# Patient Record
Sex: FEMALE | Race: AMERICAN INDIAN OR ALASKA NATIVE
[De-identification: names, ages, dates, MRNs, and addresses within clinical notes are randomized per-mention and may not be internally consistent; named-entity substitution may affect disease eponyms.]

---

## 2017-08-04 NOTE — EMERGENCY DEPARTMENT REPORT
Entered by ANGELINA SHEFFIELD, acting as scribe for ANTONIA CARVALHO PA.





ED N/V/D HPI





- General


Chief complaint: Nausea/Vomiting/Diarrhea


Stated complaint: SICK


Time Seen by Provider: 08/04/17 16:05


Source: patient


Mode of arrival: Ambulatory


Limitations: No Limitations





- History of Present Illness


Initial comments: 





23 y/o female with no significant PMHx presents to the ED c/o nausea and 

diarrhea that began this morning. Patient states she hasn't "been feeling right

" for 2 days. Reports associated generalized body aches and urinary frequency, 

but she denies headache, dizziness, stiff neck, fever, chills, abdominal pain, 

vomiting, dysuria, urinary urgency, vaginal discharge, back pain, cough, chest 

pain, and SOB. Rates body aches a 7/10 in severity, which she describes as dull 

and aching in quality.  Noted after eating breakfast this morning.Aggravated 

with movement and alleviated with immobilization. Denies any PMHx and PSHx. 

Denies taking any new or recent medication at home. Denies taking any 

medication for pain. Denies EtOH use. LMP 08/03/2017. CLARISSA STANLEY complaint: nausea, diarrhea, other (body aches)


-: This morning


Description of Vomiting: food contents


Description of Diarrhea: water


Associated Abdominal Pain: No (body aches)


Radiation: none


Severity: severe


Pain Scale: 7


Quality: aching, dull


Consistency: constant


Improves with: none


Worsens with: movement


Context: possible food poisoning


Associated Symptoms: denies other symptoms, nausea/vomiting (denies vomiting), 

other (generalized body aches, urinary frequency, and diarrhea).  denies: chest 

pain, cough, diaphoresis, fever/chills, headaches, loss of appetite, malaise, 

rash, dysuria, shortness of breath, syncope, weakness





- Related Data


 Previous Rx's











 Medication  Instructions  Recorded  Last Taken  Type


 


Dicyclomine [Bentyl] 20 mg PO Q8H PRN #9 tablet 08/04/17 Unknown Rx


 


Promethazine [Phenergan TAB] 25 mg PO Q8HR PRN #12 tab 08/04/17 Unknown Rx











 Allergies











Allergy/AdvReac Type Severity Reaction Status Date / Time


 


No Known Allergies Allergy   Unverified 08/04/17 14:03














ED Review of Systems


Comment: All other systems reviewed and negative


Constitutional: denies: chills, diaphoresis, fever, weakness


Eyes: denies: eye pain, eye discharge, vision change


ENT: denies: ear pain, throat pain


Respiratory: denies: cough, orthopnea, shortness of breath, SOB with exertion, 

SOB at rest, stridor, wheezing


Cardiovascular: denies: chest pain, palpitations, dyspnea on exertion, orthopnea

, edema, syncope, paroxysmal nocturnal dyspnea


Endocrine: no symptoms reported


Gastrointestinal: nausea, diarrhea.  denies: abdominal pain, vomiting


Genitourinary: frequency.  denies: urgency, dysuria, hematuria, discharge, 

abnormal menses, dyspareunia


Musculoskeletal: myalgia.  denies: back pain, joint swelling, arthralgia


Skin: denies: rash, lesions


Neurological: denies: headache, weakness, paresthesias, confusion, abnormal gait

, vertigo


Hematological/Lymphatic: denies: easy bleeding, easy bruising





ED Past Medical Hx





- Past Medical History


Previous Medical History?: No





- Surgical History


Past Surgical History?: No





- Family History


Family history: no significant





- Social History


Smoking Status: Never Smoker


Substance Use Type: None


Other Social History: 





Single





- Medications


Home Medications: 


 Home Medications











 Medication  Instructions  Recorded  Confirmed  Last Taken  Type


 


Dicyclomine [Bentyl] 20 mg PO Q8H PRN #9 tablet 08/04/17  Unknown Rx


 


Promethazine [Phenergan TAB] 25 mg PO Q8HR PRN #12 tab 08/04/17  Unknown Rx














ED Physical Exam





- General


Limitations: No Limitations


General appearance: alert, in no apparent distress





- Head


Head exam: Present: atraumatic, normocephalic, normal inspection





- Eye


Eye exam: Present: normal appearance, PERRL, EOMI.  Absent: scleral icterus, 

conjunctival injection, nystagmus, periorbital swelling, periorbital tenderness


Pupils: Present: normal accommodation





- ENT


ENT exam: Present: normal exam, normal orophraynx, mucous membranes moist, TM's 

normal bilaterally, normal external ear exam





- Neck


Neck exam: Present: normal inspection, full ROM.  Absent: tenderness, 

meningismus, lymphadenopathy, thyromegaly





- Respiratory


Respiratory exam: Present: normal lung sounds bilaterally.  Absent: respiratory 

distress, wheezes, rales, rhonchi, stridor, chest wall tenderness, accessory 

muscle use, decreased breath sounds, prolonged expiratory





- Cardiovascular


Cardiovascular Exam: Present: regular rate, normal rhythm, normal heart sounds.

  Absent: systolic murmur, diastolic murmur





- GI/Abdominal


GI/Abdominal exam: Present: soft, normal bowel sounds.  Absent: distended, 

tenderness, guarding, rebound, rigid





- Extremities Exam


Extremities exam: Present: normal inspection, full ROM, normal capillary 

refill.  Absent: tenderness, pedal edema, joint swelling, calf tenderness





- Back Exam


Back exam: Present: normal inspection, full ROM.  Absent: tenderness, CVA 

tenderness (R), CVA tenderness (L), muscle spasm, paraspinal tenderness, 

vertebral tenderness, rash noted





- Neurological Exam


Neurological exam: Present: alert, oriented X3, normal gait, reflexes normal.  

Absent: motor sensory deficit





- Psychiatric


Psychiatric exam: Present: normal affect, normal mood





- Skin


Skin exam: Present: warm, dry, intact, normal color.  Absent: rash, cyanosis





ED Course


 Vital Signs











  08/04/17 08/04/17





  14:04 16:02


 


Temperature 98.4 F 


 


Pulse Rate 73 57 L


 


Respiratory 17 16





Rate  


 


Blood Pressure 113/65 


 


Blood Pressure  111/65





[Left]  


 


O2 Sat by Pulse 100 97





Oximetry  














- Reevaluation(s)


Reevaluation #1: 





08/04/17 18:08


She given IV fluids 1 L NS in emergency room, she is also given Zofran 4 mg 

IV.  Voice relief of her nausea and did not have any episode of diarrhea or 

vomiting while in the emergency room.  Patient also tolerated 480 mL of 

cranberry juice without any nausea or vomiting.  She says she is feeling better.





ED Medical Decision Making





- Lab Data


Result diagrams: 


 08/04/17 14:10





 08/04/17 14:10








 Lab Results











  08/04/17 08/04/17 08/04/17 Range/Units





  14:10 14:10 14:10 


 


WBC  4.6    (4.5-11.0)  K/mm3


 


RBC  4.50    (3.65-5.03)  M/mm3


 


Hgb  13.4    (10.1-14.3)  gm/dl


 


Hct  40.9    (30.3-42.9)  %


 


MCV  91    (79-97)  fl


 


MCH  30    (28-32)  pg


 


MCHC  33    (30-34)  %


 


RDW  13.5    (13.2-15.2)  %


 


Plt Count  229    (140-440)  K/mm3


 


Lymph % (Auto)  51.8 H    (13.4-35.0)  %


 


Mono % (Auto)  8.3 H    (0.0-7.3)  %


 


Eos % (Auto)  2.0    (0.0-4.3)  %


 


Baso % (Auto)  0.7    (0.0-1.8)  %


 


Lymph #  2.4    (1.2-5.4)  K/mm3


 


Mono #  0.4    (0.0-0.8)  K/mm3


 


Eos #  0.1    (0.0-0.4)  K/mm3


 


Baso #  0.0    (0.0-0.1)  K/mm3


 


Seg Neutrophils %  37.2 L    (40.0-70.0)  %


 


Seg Neutrophils #  1.7 L    (1.8-7.7)  K/mm3


 


Sodium   137   (137-145)  mmol/L


 


Potassium   3.6   (3.6-5.0)  mmol/L


 


Chloride   100.4   ()  mmol/L


 


Carbon Dioxide   21 L   (22-30)  mmol/L


 


Anion Gap   19   mmol/L


 


BUN   5 L   (7-17)  mg/dL


 


Creatinine   0.6 L   (0.7-1.2)  mg/dL


 


Estimated GFR   > 60   ml/min


 


BUN/Creatinine Ratio   8.33   %


 


Glucose   87   ()  mg/dL


 


Calcium   9.1   (8.4-10.2)  mg/dL


 


HCG, Qual    Negative  (Negative)  


 


Urine Color     (Yellow)  


 


Urine Turbidity     (Clear)  


 


Urine pH     (5.0-7.0)  


 


Ur Specific Gravity     (1.003-1.030)  


 


Urine Protein     (Negative)  mg/dL


 


Urine Glucose (UA)     (Negative)  mg/dL


 


Urine Ketones     (Negative)  mg/dL


 


Urine Blood     (Negative)  


 


Urine Nitrite     (Negative)  


 


Urine Bilirubin     (Negative)  


 


Urine Urobilinogen     (<2.0)  mg/dL


 


Ur Leukocyte Esterase     (Negative)  


 


Urine WBC (Auto)     (0.0-6.0)  /HPF


 


Urine RBC (Auto)     (0.0-6.0)  /HPF


 


U Epithel Cells (Auto)     (0-13.0)  /HPF


 


Urine Mucus     /HPF














  08/04/17 Range/Units





  16:01 


 


WBC   (4.5-11.0)  K/mm3


 


RBC   (3.65-5.03)  M/mm3


 


Hgb   (10.1-14.3)  gm/dl


 


Hct   (30.3-42.9)  %


 


MCV   (79-97)  fl


 


MCH   (28-32)  pg


 


MCHC   (30-34)  %


 


RDW   (13.2-15.2)  %


 


Plt Count   (140-440)  K/mm3


 


Lymph % (Auto)   (13.4-35.0)  %


 


Mono % (Auto)   (0.0-7.3)  %


 


Eos % (Auto)   (0.0-4.3)  %


 


Baso % (Auto)   (0.0-1.8)  %


 


Lymph #   (1.2-5.4)  K/mm3


 


Mono #   (0.0-0.8)  K/mm3


 


Eos #   (0.0-0.4)  K/mm3


 


Baso #   (0.0-0.1)  K/mm3


 


Seg Neutrophils %   (40.0-70.0)  %


 


Seg Neutrophils #   (1.8-7.7)  K/mm3


 


Sodium   (137-145)  mmol/L


 


Potassium   (3.6-5.0)  mmol/L


 


Chloride   ()  mmol/L


 


Carbon Dioxide   (22-30)  mmol/L


 


Anion Gap   mmol/L


 


BUN   (7-17)  mg/dL


 


Creatinine   (0.7-1.2)  mg/dL


 


Estimated GFR   ml/min


 


BUN/Creatinine Ratio   %


 


Glucose   ()  mg/dL


 


Calcium   (8.4-10.2)  mg/dL


 


HCG, Qual   (Negative)  


 


Urine Color  Yellow  (Yellow)  


 


Urine Turbidity  Clear  (Clear)  


 


Urine pH  7.0  (5.0-7.0)  


 


Ur Specific Gravity  1.015  (1.003-1.030)  


 


Urine Protein  <15 mg/dl  (Negative)  mg/dL


 


Urine Glucose (UA)  Neg  (Negative)  mg/dL


 


Urine Ketones  Neg  (Negative)  mg/dL


 


Urine Blood  Mod  (Negative)  


 


Urine Nitrite  Neg  (Negative)  


 


Urine Bilirubin  Neg  (Negative)  


 


Urine Urobilinogen  < 2.0  (<2.0)  mg/dL


 


Ur Leukocyte Esterase  Neg  (Negative)  


 


Urine WBC (Auto)  1.0  (0.0-6.0)  /HPF


 


Urine RBC (Auto)  2.0  (0.0-6.0)  /HPF


 


U Epithel Cells (Auto)  1.0  (0-13.0)  /HPF


 


Urine Mucus  Few  /HPF














- Medical Decision Making





ED course: Patient here complaining of nausea and diarrhea that started this 

morning.  He is also reporting body aches and said that she started her period 

yesterday.  All findings for acute nausea without vomiting in, diarrhea which 

is self-limited and myalgia.  Labs reviewed and were within normal limits and I 

discussed this with patient.  This diagnosis and treatment plan.  She voiced 

understanding.  Patient with gastroenteritis from food poising.  She was given 

IV fluid normal saline 1 L in the emergency room along with Zofran 4 mg IV 

which relieved her nausea and she says she is feeling better.  Did not have any 

abdominal or back pain or any urinary burning.  She also tolerated 480 mL of 

cranberry juice without any nausea or vomiting or diarrhea.  I discussed the 

patient that she needs to eat bland diet for the next 72 hours to include 

bananas and rice sauce and toast and to avoid carbonated beverages and spicy 

food.  She does not have a primary care physician so I told her to call [facility] on Monday to schedule an appointment for female exam.  

Also instructed her if she develops nausea and vomiting that has not better 

with nausea medication, increase in diarrhea, abdominal pain to return to the 

emergency room.  Patient discharged home with prescription for Bentyl and 

Phenergan.





ED Disposition


Clinical Impression: 


 Nausea alone, Body aches





Diarrhea


Qualifiers:


 Diarrhea type: unspecified type Qualified Code(s): R19.7 - Diarrhea, 

unspecified





Disposition: DC-01 TO HOME OR SELFCARE


Is pt being admited?: No


Does the pt Need Aspirin: No


Condition: Stable


Instructions:  Acute Nausea and Vomiting (ED), Gastroenteritis (ED), Acute 

Diarrhea (ED)


Additional Instructions: 


Please eat diet to include banana, rice, applesauce and toast


Increasing her fluid intake to 2-3 L of fluid per day


Please avoid carbonated beverage, spicy food.


If you develop worsening nausea with vomiting, worsening diarrhea, fever or 

chills return to the emergency room [facility].


Take medication as prescribed but please avoid driving or operating heavy 

machinery while taking Phenergan


Prescriptions: 


Dicyclomine [Bentyl] 20 mg PO Q8H PRN #9 tablet


 PRN Reason: Muscle Spasm


Promethazine [Phenergan TAB] 25 mg PO Q8HR PRN #12 tab


 PRN Reason: Nausea


Referrals: 


[facility] [Outside] - 08/07/17


Forms:  Work/School Release Form(ED)





This documentation as recorded by the [employer] foster JASMINE,accurately 

reflects the service I personally performed and the decisions made by me,ANTONIA CARVALHO, PA.

## 2017-10-02 NOTE — EMERGENCY DEPARTMENT REPORT
ED ENT HPI





- General


Chief complaint: Dental/Oral


Stated complaint: TOOTHACHE


Time Seen by Provider: 10/02/17 00:14


Source: patient


Mode of arrival: Ambulatory


Limitations: No Limitations





- History of Present Illness


Initial comments: 





This is a 22-year-old female nontoxic, well nourished in appearance, no acute 

signs of distress presents to the ED complaining of right lower dental pain 2 

weeks.  Patient denies any trauma to the region.  Patient denies any facial 

swelling, numbness, tingling, fever, chills, nausea, vomiting, stiff neck or 

headache.  Patient denies any blurry vision.  Patient states she did not follow-

up with a dentist.  Patient denies any allergies or past medical history.  

Denies drooling, or difficulty swallowing.


MD complaint: tooth pain


-: Gradual, week(s) (2)


Location: tooth # (29)





  __________________________














  __________________________





 1 - pain





Quality: aching


Consistency: constant


Improves with: none


Worsens with: none


Context- Dental: history of dental caries, poor dental care


Associated Symptoms: gum swelling, toothache.  denies: fever, cough, pain with 

swallowing, sore throat, tinnitus, hearing loss, discharge from ear, rhinorrhea





- Related Data


 Previous Rx's











 Medication  Instructions  Recorded  Last Taken  Type


 


Dicyclomine [Bentyl] 20 mg PO Q8H PRN #9 tablet 08/04/17 Unknown Rx


 


Promethazine [Phenergan TAB] 25 mg PO Q8HR PRN #12 tab 08/04/17 Unknown Rx


 


Amoxicillin/K Clav Tab [Augmentin 1 tab PO Q12HR #20 tab 10/02/17 Unknown Rx





875 mg]    


 


traMADol [Ultram] 50 mg PO Q6HR PRN #12 tablet 10/02/17 Unknown Rx











 Allergies











Allergy/AdvReac Type Severity Reaction Status Date / Time


 


No Known Allergies Allergy   Unverified 08/04/17 14:03














ED Dental HPI





- General


Chief complaint: Dental/Oral


Stated complaint: TOOTHACHE


Time Seen by Provider: 10/02/17 00:14


Source: patient


Mode of arrival: Ambulatory


Limitations: No Limitations





- Related Data


 Previous Rx's











 Medication  Instructions  Recorded  Last Taken  Type


 


Dicyclomine [Bentyl] 20 mg PO Q8H PRN #9 tablet 08/04/17 Unknown Rx


 


Promethazine [Phenergan TAB] 25 mg PO Q8HR PRN #12 tab 08/04/17 Unknown Rx


 


Amoxicillin/K Clav Tab [Augmentin 1 tab PO Q12HR #20 tab 10/02/17 Unknown Rx





875 mg]    


 


traMADol [Ultram] 50 mg PO Q6HR PRN #12 tablet 10/02/17 Unknown Rx











 Allergies











Allergy/AdvReac Type Severity Reaction Status Date / Time


 


No Known Allergies Allergy   Unverified 08/04/17 14:03














ED Review of Systems


ROS: 


Stated complaint: TOOTHACHE


Other details as noted in HPI





Constitutional: denies: chills, fever


Eyes: denies: eye pain, eye discharge, vision change


ENT: dental pain.  denies: ear pain, throat pain


Respiratory: denies: cough, shortness of breath, wheezing


Cardiovascular: denies: chest pain, palpitations


Endocrine: no symptoms reported


Gastrointestinal: denies: abdominal pain, nausea, diarrhea


Genitourinary: denies: urgency, dysuria, discharge


Musculoskeletal: denies: back pain, joint swelling, arthralgia


Skin: denies: rash, lesions


Neurological: denies: headache, weakness, paresthesias


Psychiatric: denies: anxiety, depression


Hematological/Lymphatic: denies: easy bleeding, easy bruising





ED Past Medical Hx





- Past Medical History


Previous Medical History?: No





- Surgical History


Past Surgical History?: No





- Social History


Smoking Status: Never Smoker


Substance Use Type: None





- Medications


Home Medications: 


 Home Medications











 Medication  Instructions  Recorded  Confirmed  Last Taken  Type


 


Dicyclomine [Bentyl] 20 mg PO Q8H PRN #9 tablet 08/04/17  Unknown Rx


 


Promethazine [Phenergan TAB] 25 mg PO Q8HR PRN #12 tab 08/04/17  Unknown Rx


 


Amoxicillin/K Clav Tab [Augmentin 1 tab PO Q12HR #20 tab 10/02/17  Unknown Rx





875 mg]     


 


traMADol [Ultram] 50 mg PO Q6HR PRN #12 tablet 10/02/17  Unknown Rx














ED Physical Exam





- General


Limitations: No Limitations


General appearance: alert, in no apparent distress





- Head


Head exam: Present: atraumatic, normocephalic





- Eye


Eye exam: Present: normal appearance, PERRL, EOMI.  Absent: scleral icterus, 

conjunctival injection, nystagmus, periorbital swelling, periorbital tenderness


Pupils: Present: normal accommodation





- ENT


ENT exam: Present: normal exam, normal orophraynx, mucous membranes moist, TM's 

normal bilaterally, normal external ear exam





- Expanded ENT Exam


  ** Expanded


Ear exam: Present: normal external inspection


Mouth exam: Present: normal external inspection, tongue normal.  Absent: 

drooling, trismus, muffled voice, tongue elevation, laceration


Teeth exam: Present: dental caries, dental tenderness #, gingival enlargement, 

other (No abscess or swelling noted. )





  __________________________














  __________________________





 1 - Dental Tenderness





Throat exam: Positive: normal inspection, other (No abscess or swelling noted.)

.  Negative: tonsillar erythema, tonsillomegaly, tonsillar exudate, R 

peritonsillar mass, L peritonsillar mass





- Neck


Neck exam: Present: normal inspection, full ROM.  Absent: tenderness, 

meningismus, lymphadenopathy, thyromegaly





- Respiratory


Respiratory exam: Present: normal lung sounds bilaterally.  Absent: respiratory 

distress, wheezes, rales, rhonchi, stridor, chest wall tenderness, accessory 

muscle use, decreased breath sounds, prolonged expiratory





- Cardiovascular


Cardiovascular Exam: Present: regular rate, normal rhythm, normal heart sounds.

  Absent: bradycardia, tachycardia, irregular rhythm, systolic murmur, 

diastolic murmur, rubs, gallop





- GI/Abdominal


GI/Abdominal exam: Present: soft, normal bowel sounds.  Absent: distended, 

tenderness, guarding, rebound, rigid, diminished bowel sounds





- Rectal


Rectal exam: Present: deferred





- Extremities Exam


Extremities exam: Present: normal inspection, full ROM, normal capillary 

refill.  Absent: tenderness, pedal edema, joint swelling, calf tenderness





- Back Exam


Back exam: Present: normal inspection, full ROM.  Absent: tenderness, CVA 

tenderness (R), CVA tenderness (L), muscle spasm, paraspinal tenderness, 

vertebral tenderness, rash noted





- Neurological Exam


Neurological exam: Present: alert, oriented X3, CN II-XII intact, normal gait, 

reflexes normal





- Psychiatric


Psychiatric exam: Present: normal affect, normal mood





- Skin


Skin exam: Present: warm, dry, intact, normal color.  Absent: rash





ED Course





 Vital Signs











  10/01/17 10/01/17





  21:51 22:11


 


Temperature 98.4 F 98.4 F


 


Pulse Rate 74 79


 


Respiratory 18 18





Rate  


 


Blood Pressure 115/71 115/71


 


O2 Sat by Pulse 98 100





Oximetry  














- Reevaluation(s)


Reevaluation #1: 





10/02/17 01:04


Patient is speaking in full sentences with no signs of distress noted.


Critical care attestation.: 


If time is entered above; I have spent that time in minutes in the direct care 

of this critically ill patient, excluding procedure time.








ED Disposition


Clinical Impression: 


 Dental caries, Gingivitis





Disposition: DC-01 TO HOME OR SELFCARE


Is pt being admited?: No


Does the pt Need Aspirin: No


Condition: Stable


Instructions:  Dental Caries (ED), Gingivitis (ED), Amoxicillin/Clavulanate 

Potassium (By mouth), Tramadol (By mouth)


Additional Instructions: 


Follow-up with a dentist in 24 hours or if symptoms worsen and continue return 

to the Emergency room as soon as possible.


Do not drive or operate any machinery while taking Ultram due to drowsiness


Prescriptions: 


Amoxicillin/K Clav Tab [Augmentin 875 mg] 1 tab PO Q12HR #20 tab


traMADol [Ultram] 50 mg PO Q6HR PRN #12 tablet


 PRN Reason: Pain


Referrals: 


PRIMARY MD FEDE [Primary Care Provider] - 3-5 Days


DAMIEN SAEED MD [Staff Physician] - 3-5 Days


[facility] [Outside] - 3-5 Days


[facility] [Outside] - 24 Hours


Forms:  Work/School Release Form(ED)

## 2017-10-11 NOTE — EMERGENCY DEPARTMENT REPORT
- General


Chief Complaint: Upper Respiratory Infection


Stated Complaint: FLU LIKE SYMPTOMS,COUGHING


Time Seen by Provider: 10/11/17 17:58


Source: patient


Mode of arrival: Ambulatory


Limitations: No Limitations





- History of Present Illness


Initial Comments: 





This is a 22-year-old female nontoxic, well nourished in appearance, no acute 

signs of distress presents to the ED complaining of that presents cough, green 

mucus production, nasal drainage, body aches, sore throat 4 days.  Patient 

stated she has vomited one time after coughing.  Patient denies any vomiting or 

nausea.  Patient denies Chest pain, shortness of breath, hemoptysis, calf pain, 

calf tenderness, fever, chills, nausea, vomiting, stiff neck or headache.  

Patient denies recent travel, long car rides or recent hospital stays.  Denies 

taking oral contraceptives.  Denies any allergies or past medical history.


MD Complaint: cough, sore throat, rhinorrhea, nasal congestion


-: Gradual, days(s) (4)


Severity: mild


Severity scale (0 -10): 8


Quality: aching (shore throat)


Consistency: constant


Improves With: nothing


Worsens With: nothing


Associated Symptoms: rhinorrhea, nasal congestion, sore throat, cough.  denies: 

fever, chills, myalgias, diaphoresis, headache, stiff neck, chest pain, 

shortness of breath, abdominal pain, nausea, vomiting, diarrhea, dysuria, rash, 

confusion, right sweats, weight loss, epistaxis, hoarseness, ear pain


Treatments Prior to Arrival: none





- Related Data


 Previous Rx's











 Medication  Instructions  Recorded  Last Taken  Type


 


Dicyclomine [Bentyl] 20 mg PO Q8H PRN #9 tablet 08/04/17 Unknown Rx


 


Promethazine [Phenergan TAB] 25 mg PO Q8HR PRN #12 tab 08/04/17 Unknown Rx


 


Amoxicillin/K Clav Tab [Augmentin 1 tab PO Q12HR #20 tab 10/02/17 Unknown Rx





875 mg]    


 


traMADol [Ultram] 50 mg PO Q6HR PRN #12 tablet 10/02/17 Unknown Rx


 


Azithromycin [Zithromax Z-MYA] 250 mg PO DAILY #6 tablet 10/11/17 Unknown Rx


 


Benzonatate [Tessalon Perle] 100 mg PO Q12H #15 capsule 10/11/17 Unknown Rx











 Allergies











Allergy/AdvReac Type Severity Reaction Status Date / Time


 


No Known Allergies Allergy   Unverified 08/04/17 14:03














ED Review of Systems


ROS: 


Stated complaint: FLU LIKE SYMPTOMS,COUGHING


Other details as noted in HPI





Constitutional: denies: chills, fever


Eyes: denies: eye pain, eye discharge, vision change


ENT: throat pain.  denies: ear pain


Respiratory: cough.  denies: shortness of breath, wheezing


Cardiovascular: denies: chest pain, palpitations


Endocrine: no symptoms reported


Gastrointestinal: denies: abdominal pain, nausea, diarrhea


Genitourinary: denies: urgency, dysuria, discharge


Musculoskeletal: denies: back pain, joint swelling, arthralgia


Skin: denies: rash, lesions


Neurological: denies: headache, weakness, paresthesias


Psychiatric: denies: anxiety, depression


Hematological/Lymphatic: denies: easy bleeding, easy bruising





ED Past Medical Hx





- Past Medical History


Previous Medical History?: No





- Surgical History


Past Surgical History?: No





- Social History


Smoking Status: Never Smoker


Substance Use Type: None





- Medications


Home Medications: 


 Home Medications











 Medication  Instructions  Recorded  Confirmed  Last Taken  Type


 


Dicyclomine [Bentyl] 20 mg PO Q8H PRN #9 tablet 08/04/17  Unknown Rx


 


Promethazine [Phenergan TAB] 25 mg PO Q8HR PRN #12 tab 08/04/17  Unknown Rx


 


Amoxicillin/K Clav Tab [Augmentin 1 tab PO Q12HR #20 tab 10/02/17  Unknown Rx





875 mg]     


 


traMADol [Ultram] 50 mg PO Q6HR PRN #12 tablet 10/02/17  Unknown Rx


 


Azithromycin [Zithromax Z-MYA] 250 mg PO DAILY #6 tablet 10/11/17  Unknown Rx


 


Benzonatate [Tessalon Perle] 100 mg PO Q12H #15 capsule 10/11/17  Unknown Rx














ED Physical Exam





- General


Limitations: No Limitations


General appearance: alert, in no apparent distress





- Head


Head exam: Present: atraumatic, normocephalic, normal inspection





- Eye


Eye exam: Present: normal appearance, PERRL, EOMI.  Absent: scleral icterus, 

conjunctival injection, nystagmus, periorbital swelling, periorbital tenderness


Pupils: Present: normal accommodation





- ENT


ENT exam: Present: normal exam, normal orophraynx, mucous membranes moist, TM's 

normal bilaterally, normal external ear exam





- Neck


Neck exam: Present: normal inspection, full ROM.  Absent: tenderness, 

meningismus, lymphadenopathy, thyromegaly





- Respiratory


Respiratory exam: Present: normal lung sounds bilaterally.  Absent: respiratory 

distress, wheezes, rales, rhonchi, stridor, chest wall tenderness, accessory 

muscle use, decreased breath sounds, prolonged expiratory





- Cardiovascular


Cardiovascular Exam: Present: regular rate, normal rhythm, normal heart sounds.

  Absent: bradycardia, tachycardia, irregular rhythm, systolic murmur, 

diastolic murmur, rubs, gallop





- GI/Abdominal


GI/Abdominal exam: Present: soft, normal bowel sounds.  Absent: distended, 

tenderness, guarding, rebound, rigid, diminished bowel sounds





- Rectal


Rectal exam: Present: deferred





- Extremities Exam


Extremities exam: Present: normal inspection, full ROM, normal capillary 

refill.  Absent: tenderness, pedal edema, joint swelling, calf tenderness





- Back Exam


Back exam: Present: normal inspection, full ROM.  Absent: tenderness, CVA 

tenderness (R), CVA tenderness (L), muscle spasm, paraspinal tenderness, 

vertebral tenderness, rash noted





- Neurological Exam


Neurological exam: Present: alert, oriented X3, CN II-XII intact, normal gait, 

reflexes normal





- Psychiatric


Psychiatric exam: Present: normal affect, normal mood





- Skin


Skin exam: Present: warm, dry, intact, normal color.  Absent: rash





ED Course





 Vital Signs











  10/11/17





  17:29


 


Temperature 98.9 F


 


Pulse Rate 80


 


Respiratory 18





Rate 


 


Blood Pressure 113/73


 


O2 Sat by Pulse 99





Oximetry 














- Reevaluation(s)


Reevaluation #1: 





10/11/17 18:21


Patient is speaking in full sentences with no signs of distress noted.





ED Medical Decision Making





- Medical Decision Making





22-year-old female that presents with upper respiratory infection.  There is no 

wheezing.  He is speaking full sentences with no signs of an distress.  Patient 

be treated with azithromycin. Patient was instructed to follow-up with a 

primary care doctor in 3-5 days or if symptoms worsen and continue return to 

emergency room as soon as possible possible. Patient is hemodynamically  stable 

with stable vital signs. Patient states he is feeling better.  At time time of 

discharge, the patient does not seem toxic or ill in appearance.  No acute 

signs of distress noted.  Patient agrees to discharge treatment plan of care.  

No further questions noted by the patient.


Critical care attestation.: 


If time is entered above; I have spent that time in minutes in the direct care 

of this critically ill patient, excluding procedure time.








ED Disposition


Clinical Impression: 


Upper respiratory infection


Qualifiers:


 URI type: unspecified URI Qualified Code(s): J06.9 - Acute upper respiratory 

infection, unspecified





Disposition: DC-01 TO HOME OR SELFCARE


Is pt being admited?: No


Does the pt Need Aspirin: No


Condition: Stable


Instructions:  Upper Respiratory Infection (ED), Azithromycin (By mouth)


Additional Instructions: 


Follow-up with a primary care doctor in 3-5 days or if symptoms worsen and 

continue return to emergency room as soon as possible possible.


Prescriptions: 


Azithromycin [Zithromax Z-MYA] 250 mg PO DAILY #6 tablet


Benzonatate [Tessalon Perle] 100 mg PO Q12H #15 capsule


Referrals: 


PRIMARY CARE,MD [Primary Care Provider] - 3-5 Days


DAMIEN SAEED MD [Staff Physician] - 3-5 Days


[facility] [Outside] - 3-5 Days


[facility] [Outside] - 3-5 Days


Forms:  Work/School Release Form(ED)

## 2019-01-16 ENCOUNTER — HOSPITAL ENCOUNTER (EMERGENCY)
Dept: HOSPITAL 5 - ED | Age: 24
Discharge: HOME | End: 2019-01-16
Payer: COMMERCIAL

## 2019-01-16 VITALS — SYSTOLIC BLOOD PRESSURE: 111 MMHG | DIASTOLIC BLOOD PRESSURE: 67 MMHG

## 2019-01-16 DIAGNOSIS — N39.0: Primary | ICD-10-CM

## 2019-01-16 LAB
BACTERIA #/AREA URNS HPF: (no result) /HPF
BILIRUB UR QL STRIP: (no result)
BLOOD UR QL VISUAL: (no result)
MUCOUS THREADS #/AREA URNS HPF: (no result) /HPF
PH UR STRIP: 5 [PH] (ref 5–7)
PROT UR STRIP-MCNC: (no result) MG/DL
RBC #/AREA URNS HPF: 7 /HPF (ref 0–6)
UROBILINOGEN UR-MCNC: < 2 MG/DL (ref ?–2)
WBC #/AREA URNS HPF: 11 /HPF (ref 0–6)

## 2019-01-16 PROCEDURE — 81001 URINALYSIS AUTO W/SCOPE: CPT

## 2019-01-16 PROCEDURE — 81025 URINE PREGNANCY TEST: CPT

## 2019-01-16 PROCEDURE — 87210 SMEAR WET MOUNT SALINE/INK: CPT

## 2019-01-16 NOTE — EMERGENCY DEPARTMENT REPORT
ED Dysuria HPI





- HPI


Chief Complaint: Urogenital-Female


Stated Complaint: VAGINAL DISCOMFORT


Time Seen by Provider: 01/16/19 07:13


Duration: 3 Days


Severity: Mild


Symptoms: Dysuria: No, Frequency: No, Suprapubic Pain: No, Flank Pain: No, 

Fever: No, Hematuria: No, Abdominal Pain: No, Previous UTI's: No


Other History: She has a 23-year-old -American female who comes to the ER

today complaining of white vaginal discharge and irritation.  Last menstrual 

period was SEVERAL months ago. after she received an Implanon she has not had 

her menses.  Patient is not concerned for STI's.  She states the discharge is 

thick and colored light.  Her vital signs are stable.  She has no fever.  She 

denies dysuria.  2 sexual partners one of which is unprotected.





ED Review of Systems


ROS: 


Stated complaint: VAGINAL DISCOMFORT


Other details as noted in HPI





Comment: All other systems reviewed and negative


Constitutional: denies: chills, fever


Eyes: denies: eye pain


ENT: denies: ear pain


Respiratory: denies: no symptoms reported, orthopnea


Cardiovascular: denies: palpitations


Endocrine: denies: flushing


Gastrointestinal: as per HPI.  denies: abdominal pain, nausea, vomiting, 

diarrhea, constipation, hematemesis, melena, hematochezia


Genitourinary: discharge.  denies: urgency, dysuria, frequency, hematuria


Musculoskeletal: denies: back pain


Skin: denies: rash


Neurological: denies: weakness


Psychiatric: denies: depression


Hematological/Lymphatic: denies: easy bleeding





ED Past Medical Hx





- Past Medical History


Previous Medical History?: No





- Surgical History


Past Surgical History?: No





- Social History


Smoking Status: Never Smoker





- Medications


Home Medications: 


                                Home Medications











 Medication  Instructions  Recorded  Confirmed  Last Taken  Type


 


Fluconazole [Diflucan] 150 mg PO ONCE #1 tablet 01/16/19  Unknown Rx


 


Sulfamethoxazole/Trimethoprim 1 each PO BID #6 tablet 01/16/19  Unknown Rx





[Bactrim DS TAB]     














Dysuria Exam





- Exam


General: 


Vital signs noted. No distress. Alert and acting appropriately.





Exam: Yes Moist Mucous Membranes, No CVA Tenderness, No Abdominal Tenderness, No

Rigidity or Guarding


Exam: CERVICAL EXAM REVEALED THICK CURD LIKE DISCHARGE. NO ODOR.  NO LESIONS. NO

PAIN ON BIMANUAL EXAM.


Labs: 


                                   Lab Results











  01/16/19 Range/Units





  Unknown 


 


Urine Color  Yellow  (Yellow)  


 


Urine Turbidity  Clear  (Clear)  


 


Urine pH  5.0  (5.0-7.0)  


 


Ur Specific Gravity  1.014  (1.003-1.030)  


 


Urine Protein  <15 mg/dl  (Negative)  mg/dL


 


Urine Glucose (UA)  Neg  (Negative)  mg/dL


 


Urine Ketones  Neg  (Negative)  mg/dL


 


Urine Blood  Neg  (Negative)  


 


Urine Nitrite  Neg  (Negative)  


 


Urine Bilirubin  Neg  (Negative)  


 


Urine Urobilinogen  < 2.0  (<2.0)  mg/dL


 


Ur Leukocyte Esterase  Mod  (Negative)  


 


Urine WBC (Auto)  11.0 H  (0.0-6.0)  /HPF


 


Urine RBC (Auto)  7.0  (0.0-6.0)  /HPF


 


U Epithel Cells (Auto)  4.0  (0-13.0)  /HPF


 


Urine Bacteria (Auto)  1+  (Negative)  /HPF


 


Urine Mucus  Few  /HPF


 


Urine HCG, Qual  Negative  (Negative)  














ED Course


                                   Vital Signs











  01/16/19





  06:25


 


Temperature 98.1 F


 


Pulse Rate 70


 


Respiratory 18





Rate 


 


Blood Pressure 111/67


 


O2 Sat by Pulse 99





Oximetry 














ED Medical Decision Making





- Medical Decision Making





UPREG NEG


URINE NOTED


WET PREP NOTED





Labs











  01/16/19





  Unknown


 


Urine Color  Yellow


 


Urine Turbidity  Clear


 


Urine pH  5.0


 


Ur Specific Gravity  1.014


 


Urine Protein  <15 mg/dl


 


Urine Glucose (UA)  Neg


 


Urine Ketones  Neg


 


Urine Blood  Neg


 


Urine Nitrite  Neg


 


Urine Bilirubin  Neg


 


Urine Urobilinogen  < 2.0


 


Ur Leukocyte Esterase  Mod


 


Urine WBC (Auto)  11.0 H


 


Urine RBC (Auto)  7.0


 


U Epithel Cells (Auto)  4.0


 


Urine Bacteria (Auto)  1+


 


Urine Mucus  Few


 


Urine HCG, Qual  Negative














- Differential Diagnosis


RO STI


Critical care attestation.: 


If time is entered above; I have spent that time in minutes in the direct care 

of this critically ill patient, excluding procedure time.








ED Disposition


Clinical Impression: 


 UTI (urinary tract infection)





Disposition: DC-01 TO HOME OR SELFCARE


Is pt being admited?: No


Does the pt Need Aspirin: No


Condition: Stable


Instructions:  Urinary Tract Infection in Women (ED)


Additional Instructions: 


HYDRATE WELL WHILE TAKING THESE MEDS





FOLLOW UP WITH OBGYN AS YOU HAVE SCHEDULED





EAT YOGURT EVERY DAY WHILE ON THESE MEDS





SAFE SEX


Referrals: 


PRIMARY CARE,MD [Primary Care Provider] - 3-5 Days


JACQUELYN FERNANDEZI O, MD [Staff Physician] - 3-5 Days


Time of Disposition: 08:49